# Patient Record
Sex: FEMALE | Race: NATIVE HAWAIIAN OR OTHER PACIFIC ISLANDER
[De-identification: names, ages, dates, MRNs, and addresses within clinical notes are randomized per-mention and may not be internally consistent; named-entity substitution may affect disease eponyms.]

---

## 2018-01-16 ENCOUNTER — HOSPITAL ENCOUNTER (EMERGENCY)
Dept: HOSPITAL 31 - C.ER | Age: 63
Discharge: HOME | End: 2018-01-16
Payer: COMMERCIAL

## 2018-01-16 ENCOUNTER — HOSPITAL ENCOUNTER (OUTPATIENT)
Dept: HOSPITAL 31 - C.ENDO | Age: 63
Discharge: HOME | End: 2018-01-16
Attending: SPECIALIST
Payer: COMMERCIAL

## 2018-01-16 VITALS — BODY MASS INDEX: 21.9 KG/M2

## 2018-01-16 VITALS — DIASTOLIC BLOOD PRESSURE: 70 MMHG | SYSTOLIC BLOOD PRESSURE: 115 MMHG | HEART RATE: 82 BPM

## 2018-01-16 VITALS — BODY MASS INDEX: 21.7 KG/M2

## 2018-01-16 VITALS — HEART RATE: 80 BPM | SYSTOLIC BLOOD PRESSURE: 101 MMHG | DIASTOLIC BLOOD PRESSURE: 52 MMHG | RESPIRATION RATE: 18 BRPM

## 2018-01-16 VITALS — TEMPERATURE: 98 F

## 2018-01-16 VITALS — OXYGEN SATURATION: 100 % | RESPIRATION RATE: 20 BRPM

## 2018-01-16 VITALS — OXYGEN SATURATION: 100 %

## 2018-01-16 DIAGNOSIS — K64.8: ICD-10-CM

## 2018-01-16 DIAGNOSIS — K60.2: ICD-10-CM

## 2018-01-16 DIAGNOSIS — K29.70: Primary | ICD-10-CM

## 2018-01-16 DIAGNOSIS — K62.5: Primary | ICD-10-CM

## 2018-01-16 DIAGNOSIS — R19.7: ICD-10-CM

## 2018-01-16 LAB
ALBUMIN SERPL-MCNC: 4.6 G/DL (ref 3.5–5)
ALBUMIN/GLOB SERPL: 1.3 {RATIO} (ref 1–2.1)
ALT SERPL-CCNC: 42 U/L (ref 9–52)
AST SERPL-CCNC: 39 U/L (ref 14–36)
BASOPHILS # BLD AUTO: 0 K/UL (ref 0–0.2)
BASOPHILS NFR BLD: 0.5 % (ref 0–2)
BUN SERPL-MCNC: 10 MG/DL (ref 7–17)
CALCIUM SERPL-MCNC: 8.8 MG/DL (ref 8.6–10.4)
EOSINOPHIL # BLD AUTO: 0.1 K/UL (ref 0–0.7)
EOSINOPHIL NFR BLD: 1.1 % (ref 0–4)
ERYTHROCYTE [DISTWIDTH] IN BLOOD BY AUTOMATED COUNT: 13.5 % (ref 11.5–14.5)
GFR NON-AFRICAN AMERICAN: > 60
HGB BLD-MCNC: 15.4 G/DL (ref 11–16)
LYMPHOCYTES # BLD AUTO: 1.9 K/UL (ref 1–4.3)
LYMPHOCYTES NFR BLD AUTO: 21 % (ref 20–40)
MCH RBC QN AUTO: 29.6 PG (ref 27–31)
MCHC RBC AUTO-ENTMCNC: 33.4 G/DL (ref 33–37)
MCV RBC AUTO: 88.4 FL (ref 81–99)
MONOCYTES # BLD: 0.4 K/UL (ref 0–0.8)
MONOCYTES NFR BLD: 4.6 % (ref 0–10)
NEUTROPHILS # BLD: 6.7 K/UL (ref 1.8–7)
NEUTROPHILS NFR BLD AUTO: 72.8 % (ref 50–75)
NRBC BLD AUTO-RTO: 0.3 % (ref 0–2)
PLATELET # BLD: 190 K/UL (ref 130–400)
PMV BLD AUTO: 8.2 FL (ref 7.2–11.7)
RBC # BLD AUTO: 5.23 MIL/UL (ref 3.8–5.2)
WBC # BLD AUTO: 9.3 K/UL (ref 4.8–10.8)

## 2018-01-16 PROCEDURE — 45380 COLONOSCOPY AND BIOPSY: CPT

## 2018-01-16 PROCEDURE — 96374 THER/PROPH/DIAG INJ IV PUSH: CPT

## 2018-01-16 PROCEDURE — 96361 HYDRATE IV INFUSION ADD-ON: CPT

## 2018-01-16 PROCEDURE — 88305 TISSUE EXAM BY PATHOLOGIST: CPT

## 2018-01-16 PROCEDURE — 80053 COMPREHEN METABOLIC PANEL: CPT

## 2018-01-16 PROCEDURE — 85025 COMPLETE CBC W/AUTO DIFF WBC: CPT

## 2018-01-16 PROCEDURE — 99285 EMERGENCY DEPT VISIT HI MDM: CPT

## 2018-01-16 NOTE — C.PDOC
History Of Present Illness


62 year old female presents to the ER via EMS with a complaint of dizziness, 

abdominal pain, and generalized malaise. Patient states she is prepping for a 

colonoscopy today and since then he has had several episodes of diarrhea. 

Denies rectal bleeding or syncope.


Time Seen by Provider: 18 04:24


Chief Complaint (Nursing): Abdominal Pain


History Per: Patient


History/Exam Limitations: no limitations


Onset/Duration Of Symptoms: Hrs


Current Symptoms Are (Timing): Still Present


Location Of Pain/Discomfort: Diffuse


Radiation Of Pain To:: None


Quality Of Discomfort: Unable To Describe


Associated Symptoms: Diarrhea, Other (Abdominal pain, dizziness, malaise).  

denies: Fever, Chills


Exacerbating Factors: None


Alleviating Factors: None


Recent travel outside of the [place]: No


Abnormal Vaginal Bleeding: No





Past Medical History


Reviewed: Historical Data, Nursing Documentation, Vital Signs


Vital Signs: 


 Last Vital Signs











Temp      


 


Pulse  80   18 06:23


 


Resp  18   18 06:23


 


BP  101/52 L  18 06:23


 


Pulse Ox  100   18 06:29











Family History: States: Unknown Family Hx





- Social History


Hx Alcohol Use: No


Hx Substance Use: No





Review Of Systems


Constitutional: Positive for: Weakness, Malaise.  Negative for: Fever, Chills


Gastrointestinal: Positive for: Diarrhea.  Negative for: Other (Rectal bleed)


Skin: Positive for: Other (Pale)


Neurological: Positive for: Dizziness.  Negative for: Other (Syncope)





Physical Exam





- Physical Exam


Appears: Non-toxic


Skin: Warm, Dry, Pale


Head: Atraumatic, Normacephalic


Eye(s): bilateral: Normal Inspection


Oral Mucosa: Moist


Chest: Symmetrical, No Tenderness


Cardiovascular: Rhythm Regular


Respiratory: Normal Breath Sounds, No Rales, No Rhonchi, No Wheezing


Gastrointestinal/Abdominal: Soft, No Tenderness


Neurological/Psych: Oriented x3, Normal Speech





ED Course And Treatment





- Laboratory Results


Result Diagrams: 


 18 04:59





 18 04:59


ECG: Interpreted By Me, Viewed By Me


ECG Rhythm: Sinus Rhythm


Interpretation Of ECG: No acute ST/T changes


Rate From EC


O2 Sat by Pulse Oximetry: 100 (Room air)


Pulse Ox Interpretation: Normal


Progress Note: Blood work ordered, results were negative. Pepcid and IV fluids 

administered. Labs reviewed and all wnl. On reevaluation, patient reports 

improvement of symptoms and is resting comfortably in the ER in no distress. Pt 

will be discharged an advised to keep appointment for colonoscopy- pt will go 

now to prescreenin area.  Return precautions were d/w pt.





Disposition





- Disposition


Referrals: 


Aretha Daugherty [Staff Provider] - 


Disposition: HOME/ ROUTINE


Disposition Time: 06:25


Condition: STABLE


Additional Instructions: 


Please keep appointment for prescreening for Colonoscopy today 





Return to ER if worse 


Instructions:  Gastritis (ED)


Forms:  [facility] (English)





- Clinical Impression


Clinical Impression: 


 Diarrhea, Gastritis








- PA / NP / Resident Statement


MD/DO has reviewed & agrees with the documentation as recorded.





- Scribe Statement


The provider has reviewed the documentation as recorded by the Scribjoann Kulkarni





All medical record entries made by the Jeronimoibjoann were at my direction and 

personally dictated by me. I have reviewed the chart and agree that the record 

accurately reflects my personal performance of the history, physical exam, 

medical decision making, and the department course for this patient. I have 

also personally directed, reviewed, and agree with the discharge instructions 

and disposition.

## 2018-01-16 NOTE — CP.SDSHP
Same Day Surgery [facility] & [facility]





- History


Proposed Procedure: COLONSCOPY


Pre-Op Diagnosis: SEE NOTES





- Previous Medical/Surgical History


Neuro: Other


Misc: Other


Pain: 4.Moderate Pain





- Allergies


Allergies: 


Allergies





No Known Allergies Allergy (Verified 10/09/15 12:56)


 











- Physical Exam


General Appearance: N


Vital Signs: 


 Vital Signs











  01/16/18





  08:20


 


Temperature 97 F L


 


Pulse Rate 80


 


Respiratory 20





Rate 


 


Blood Pressure 105/52 L


 


O2 Sat by Pulse 100





Oximetry 











Mental Status: Alert & Oriented x3


Neuro: WNL


Heart: WNL


Lungs: WNL


GI: Other





- {Optional Preform as Required}


Breast: WNL


Abdomen: Other


Rectal: Other


Integument: WNL


: WNL


Ortho: WNL


ENT: WNL





- Impression


Pt. Evaluated Today:Candidate for Anesthesia & Procedure: Yes





- Date & Time


Time: 09:30





Short Stay Discharge





- Short Stay Discharge


Admitting Diagnosis/Reason for Visit: RECTAL BLEEDING


Disposition: HOME/ ROUTINE

## 2018-01-18 NOTE — CARD
--------------- APPROVED REPORT --------------





EKG Measurement

Heart [id number]

[id number]

[id number]

[id number]

[id number]



<Conclusion>

Sinus rhythm with 1st degree AV block

Possible Left atrial enlargement

Borderline ECG